# Patient Record
Sex: MALE | Race: WHITE | NOT HISPANIC OR LATINO | URBAN - METROPOLITAN AREA
[De-identification: names, ages, dates, MRNs, and addresses within clinical notes are randomized per-mention and may not be internally consistent; named-entity substitution may affect disease eponyms.]

---

## 2025-05-30 ENCOUNTER — OFFICE VISIT (OUTPATIENT)
Dept: URGENT CARE | Age: 60
End: 2025-05-30
Payer: COMMERCIAL

## 2025-05-30 VITALS
OXYGEN SATURATION: 94 % | TEMPERATURE: 98.2 F | HEART RATE: 104 BPM | DIASTOLIC BLOOD PRESSURE: 92 MMHG | RESPIRATION RATE: 20 BRPM | SYSTOLIC BLOOD PRESSURE: 160 MMHG

## 2025-05-30 DIAGNOSIS — H61.23 BILATERAL IMPACTED CERUMEN: ICD-10-CM

## 2025-05-30 DIAGNOSIS — N30.01 ACUTE CYSTITIS WITH HEMATURIA: Primary | ICD-10-CM

## 2025-05-30 DIAGNOSIS — J30.9 ALLERGIC SINUSITIS: ICD-10-CM

## 2025-05-30 DIAGNOSIS — R31.9 HEMATURIA, UNSPECIFIED TYPE: ICD-10-CM

## 2025-05-30 DIAGNOSIS — R03.0 ELEVATED BP WITHOUT DIAGNOSIS OF HYPERTENSION: ICD-10-CM

## 2025-05-30 PROBLEM — E03.9 ACQUIRED HYPOTHYROIDISM: Status: ACTIVE | Noted: 2023-09-22

## 2025-05-30 LAB
POC APPEARANCE, URINE: ABNORMAL
POC BILIRUBIN, URINE: NEGATIVE
POC BLOOD, URINE: ABNORMAL
POC COLOR, URINE: YELLOW
POC GLUCOSE, URINE: NEGATIVE MG/DL
POC KETONES, URINE: ABNORMAL MG/DL
POC LEUKOCYTES, URINE: ABNORMAL
POC NITRITE,URINE: NEGATIVE
POC PH, URINE: 6.5 PH
POC PROTEIN, URINE: ABNORMAL MG/DL
POC SPECIFIC GRAVITY, URINE: 1.02
POC UROBILINOGEN, URINE: 0.2 EU/DL

## 2025-05-30 RX ORDER — DEXMETHYLPHENIDATE HYDROCHLORIDE 40 MG/1
40 CAPSULE, EXTENDED RELEASE ORAL
COMMUNITY
Start: 2025-05-09

## 2025-05-30 RX ORDER — CLONAZEPAM 1 MG/1
1 TABLET ORAL 2 TIMES DAILY
COMMUNITY

## 2025-05-30 RX ORDER — FLUOXETINE HYDROCHLORIDE 40 MG/1
40 CAPSULE ORAL DAILY
COMMUNITY

## 2025-05-30 RX ORDER — CEFUROXIME AXETIL 500 MG/1
500 TABLET ORAL 2 TIMES DAILY
Qty: 20 TABLET | Refills: 0 | Status: SHIPPED | OUTPATIENT
Start: 2025-05-30 | End: 2025-06-09

## 2025-05-30 RX ORDER — DEXMETHYLPHENIDATE HYDROCHLORIDE 10 MG/1
1 TABLET ORAL
COMMUNITY
Start: 2025-05-09

## 2025-05-30 RX ORDER — LEVOTHYROXINE SODIUM 50 UG/1
50 TABLET ORAL
COMMUNITY

## 2025-05-30 ASSESSMENT — ENCOUNTER SYMPTOMS
NAUSEA: 0
POLYPHAGIA: 0
DYSURIA: 0
SINUS PRESSURE: 1
POLYDIPSIA: 0
WEIGHT LOSS: 0
RHINORRHEA: 1
HEMATURIA: 1
VOMITING: 0
FACIAL SWELLING: 0
FEVER: 0
FATIGUE: 0
FLANK PAIN: 0
SORE THROAT: 0
CHILLS: 0
WHEEZING: 0
TROUBLE SWALLOWING: 0
FREQUENCY: 1
ABDOMINAL PAIN: 0
SINUS PAIN: 0
DIFFICULTY URINATING: 0
COUGH: 0
SHORTNESS OF BREATH: 0

## 2025-05-30 ASSESSMENT — LIFESTYLE VARIABLES: TOBACCO_USE: 0

## 2025-05-30 NOTE — PATIENT INSTRUCTIONS
1. Urinary Tract Infection (UTI):  Medications: Take the prescribed antibiotic exactly as directed. Finish the entire course, even if you start to feel better.  Hydration: Drink plenty of fluids to help flush out the bacteria.  Symptoms to Monitor: Contact your provider or go to the ER if you develop fever, chills, back/flank pain, nausea/vomiting, or worsening urinary symptoms.  2. Seasonal Allergies / Sinusitis:  Medications: Continue using your antihistamine (e.g., Claritin, Zyrtec) and Flonase (nasal steroid spray) daily.  Supportive Care: Use saline nasal spray, steam inhalation, and stay hydrated.  Antibiotics (if prescribed): Take as directed if symptoms suggest bacterial sinusitis  3. Cerumen Impaction (Bilateral):  Ear Care: Over-the-counter earwax softening drops (e.g., Debrox) can be used at home as directed.  Follow-Up: Return to clinic or ENT for irrigation or manual removal if symptoms persist (e.g., hearing loss, pressure, discomfort).  General Instructions:  Get plenty of rest.  Avoid allergens as much as possible.  Follow up with your PCP if symptoms worsen or do not improve as expected.  Return sooner for fever, inability to urinate, back pain, vomiting, severe headache, or new symptoms.

## 2025-05-30 NOTE — PROGRESS NOTES
"Subjective   Patient ID: Pedro Olson is a 60 y.o. male. They present today with a chief complaint of Nasal Congestion (X few weeks on/off - pt has seasonal allergies) and Blood in Urine (X 1 day).    History of Present Illness  60-year-old male presents with acute onset of urinary symptoms starting earlier today, including urinary urgency, frequency, dysuria, and visible blood in the urine. He denies any fever, chills, flank or back pain, and has no concerns for a sexually transmitted infection. Reports possible mild dehydration, stating he drank \"a lot of dark beer\" over the weekend. No prior history of similar symptoms reported. Also reports ear pressure and reduced hearing in both ears over the past week.       Blood in Urine  This is a new problem. The current episode started today. The problem is unchanged. He reports no clotting in his urine stream. His pain is at a severity of 1/10. The pain is mild. He describes his urine color as light pink. Irritative symptoms include frequency and urgency. Irritative symptoms do not include nocturia. Obstructive symptoms do not include dribbling, incomplete emptying, an intermittent stream, a slower stream, straining or a weak stream. Pertinent negatives include no abdominal pain, bladder pain, bone pain, chills, dysuria, facial swelling, fever, flank pain, genital pain, hematospermia, hesitancy, inability to urinate, nausea, urinary retention, vomiting or weight loss. He is not sexually active. There is no history of BPH,  trauma, hypertension, kidney stones, prostatitis, recent infection, sickle cell disease, STDs or tobacco use.       Past Medical History  Allergies as of 05/30/2025    (No Known Allergies)       Prescriptions Prior to Admission[1]     Medical History[2]    Surgical History[3]     reports that he has never smoked. He has never used smokeless tobacco.    Review of Systems  Review of Systems   Constitutional:  Negative for chills, fatigue, fever and " weight loss.   HENT:  Positive for hearing loss, postnasal drip, rhinorrhea and sinus pressure. Negative for congestion, ear discharge, ear pain, facial swelling, sinus pain, sneezing, sore throat and trouble swallowing.    Respiratory:  Negative for cough, shortness of breath and wheezing.    Cardiovascular:  Negative for chest pain.   Gastrointestinal:  Negative for abdominal pain, nausea and vomiting.   Endocrine: Negative for polydipsia, polyphagia and polyuria.   Genitourinary:  Positive for frequency, hematuria and urgency. Negative for decreased urine volume, difficulty urinating, dysuria, flank pain, hesitancy, incomplete emptying, nocturia, penile discharge, penile pain, penile swelling, scrotal swelling and testicular pain.         Objective    Vitals:    05/30/25 1500 05/30/25 1609   BP: (!) 176/96 (!) 160/92   Pulse: 104    Resp: 20    Temp: 36.8 °C (98.2 °F)    TempSrc: Temporal    SpO2: 94%      No LMP for male patient.    Physical Exam  Vitals and nursing note reviewed.   Constitutional:       General: He is not in acute distress.     Appearance: Normal appearance. He is not ill-appearing.   HENT:      Right Ear: External ear normal. There is impacted cerumen.      Left Ear: External ear normal. There is impacted cerumen.      Nose: Rhinorrhea present. No congestion.      Mouth/Throat:      Mouth: Mucous membranes are moist.      Pharynx: Oropharynx is clear. No oropharyngeal exudate or posterior oropharyngeal erythema.   Eyes:      Conjunctiva/sclera: Conjunctivae normal.      Pupils: Pupils are equal, round, and reactive to light.   Cardiovascular:      Rate and Rhythm: Normal rate and regular rhythm.      Pulses: Normal pulses.      Heart sounds: Normal heart sounds. No murmur heard.  Pulmonary:      Effort: Pulmonary effort is normal. No respiratory distress.      Breath sounds: Normal breath sounds.   Abdominal:      General: Abdomen is flat. Bowel sounds are normal.      Tenderness: There is no  abdominal tenderness. There is no right CVA tenderness or left CVA tenderness.   Musculoskeletal:         General: Normal range of motion.      Cervical back: Normal range of motion and neck supple.   Lymphadenopathy:      Cervical: No cervical adenopathy.   Skin:     General: Skin is warm and dry.   Neurological:      Mental Status: He is alert and oriented to person, place, and time.   Psychiatric:         Mood and Affect: Mood normal.         Behavior: Behavior normal.         Ear Cerumen Removal    Date/Time: 5/30/2025 4:10 PM    Performed by: DARCI Washburn  Authorized by: DARCI Washburn    Consent:     Consent obtained:  Verbal    Consent given by:  Patient    Risks, benefits, and alternatives were discussed: yes      Risks discussed:  Bleeding, dizziness, incomplete removal, infection, pain and TM perforation    Alternatives discussed:  No treatment, delayed treatment, alternative treatment, observation and referral  Universal protocol:     Procedure explained and questions answered to patient or proxy's satisfaction: yes      Patient identity confirmed:  Verbally with patient  Procedure details:     Location:  R ear and L ear    Procedure type: irrigation      Procedure outcomes: unable to remove cerumen    Post-procedure details:     Post-procedure ear inspection: no change.    Hearing quality:  Loss of hearing    Procedure completion:  Tolerated well, no immediate complications      Point of Care Test & Imaging Results from this visit  Results for orders placed or performed in visit on 05/30/25   POCT UA Automated manually resulted   Result Value Ref Range    POC Color, Urine Yellow Straw, Yellow, Light-Yellow    POC Appearance, Urine Hazy (A) Clear    POC Glucose, Urine NEGATIVE NEGATIVE mg/dl    POC Bilirubin, Urine NEGATIVE NEGATIVE    POC Ketones, Urine TRACE (A) NEGATIVE mg/dl    POC Specific Gravity, Urine 1.020 1.005 - 1.035    POC Blood, Urine LARGE (3+) (A) NEGATIVE    POC  PH, Urine 6.5 No Reference Range Established PH    POC Protein, Urine 30 (1+) (A) NEGATIVE mg/dl    POC Urobilinogen, Urine 0.2 0.2, 1.0 EU/DL    Poc Nitrite, Urine NEGATIVE NEGATIVE    POC Leukocytes, Urine MODERATE (2+) (A) NEGATIVE      Imaging  No results found.    Cardiology, Vascular, and Other Imaging  No other imaging results found for the past 2 days      Diagnostic study results (if any) were reviewed by DARCI Washburn.    Assessment/Plan   Allergies, medications, history, and pertinent labs/EKGs/Imaging reviewed by DARCI Washburn.     Medical Decision Making  60-year-old male presents with symptoms consistent with a urinary tract infection, seasonal allergic rhinitis, sinus congestion, and bilateral cerumen impaction. Patient is afebrile and hemodynamically stable today. Urinalysis is supportive of UTI; antibiotic initiated. Sinus symptoms are likely related to seasonal allergies and mild sinusitis--treatment includes continuation of antihistamine and nasal steroid. Cerumen impaction noted bilaterally; no signs of infection or tympanic membrane compromise. Conservative management advised with OTC softening drops and ENT referral if symptoms persist. Pt to return in a couple days to try and flush ears. No red flag symptoms at this time. Patient stable for discharge with close outpatient follow-up.    Orders and Diagnoses  Diagnoses and all orders for this visit:  Acute cystitis with hematuria  -     cefuroxime (Ceftin) 500 mg tablet; Take 1 tablet (500 mg) by mouth 2 times a day for 10 days.  -     Urine Culture  Hematuria, unspecified type  -     POCT UA Automated manually resulted  Bilateral impacted cerumen  -     Ear Cerumen Removal; Future  Allergic sinusitis  Elevated BP without diagnosis of hypertension  Other orders  -     Ear Cerumen Removal      Medical Admin Record      Patient disposition: Home    Electronically signed by DARCI Washburn  4:12 PM           [1]  (Not in a hospital admission)   [2] History reviewed. No pertinent past medical history.  [3] History reviewed. No pertinent surgical history.

## 2025-06-01 ENCOUNTER — OFFICE VISIT (OUTPATIENT)
Dept: URGENT CARE | Age: 60
End: 2025-06-01

## 2025-06-01 VITALS
RESPIRATION RATE: 18 BRPM | BODY MASS INDEX: 23 KG/M2 | SYSTOLIC BLOOD PRESSURE: 171 MMHG | OXYGEN SATURATION: 98 % | WEIGHT: 185 LBS | DIASTOLIC BLOOD PRESSURE: 90 MMHG | HEIGHT: 75 IN | TEMPERATURE: 96.5 F | HEART RATE: 102 BPM

## 2025-06-01 DIAGNOSIS — H61.23 BILATERAL IMPACTED CERUMEN: Primary | ICD-10-CM

## 2025-06-01 LAB — BACTERIA UR CULT: ABNORMAL

## 2025-06-01 ASSESSMENT — ENCOUNTER SYMPTOMS
SHORTNESS OF BREATH: 0
CHEST TIGHTNESS: 0
JOINT SWELLING: 0
FEVER: 0
EYE DISCHARGE: 0
EYE PAIN: 0
COUGH: 0
SINUS PAIN: 0
VOMITING: 0
ARTHRALGIAS: 0
SORE THROAT: 0
FATIGUE: 0
NAUSEA: 0
CHILLS: 0
ABDOMINAL PAIN: 0
EYE REDNESS: 0
DIARRHEA: 0
EYE ITCHING: 0
RHINORRHEA: 0

## 2025-06-01 ASSESSMENT — PAIN SCALES - GENERAL: PAINLEVEL_OUTOF10: 0-NO PAIN

## 2025-06-01 NOTE — PROGRESS NOTES
"Subjective   Patient ID: Pedro Olson is a 60 y.o. male. They present today with a chief complaint of Other (Ear flush was here Friday.).    History of Present Illness  Pt returned for ear wash. Has been using Debrox daily.           Past Medical History  Allergies as of 06/01/2025    (No Known Allergies)       Prescriptions Prior to Admission[1]     Medical History[2]    Surgical History[3]     reports that he has never smoked. He has never used smokeless tobacco.    Review of Systems  Review of Systems   Constitutional:  Negative for chills, fatigue and fever.   HENT:  Negative for ear discharge, ear pain, rhinorrhea, sinus pain, sneezing and sore throat.         Clogged ear   Eyes:  Negative for pain, discharge, redness and itching.   Respiratory:  Negative for cough, chest tightness and shortness of breath.    Cardiovascular:  Negative for chest pain.   Gastrointestinal:  Negative for abdominal pain, diarrhea, nausea and vomiting.   Musculoskeletal:  Negative for arthralgias and joint swelling.   Skin:  Negative for rash.                                  Objective    Vitals:    06/01/25 1028   BP: 171/90   BP Location: Right arm   Patient Position: Sitting   BP Cuff Size: Adult   Pulse: 102   Resp: 18   Temp: 35.8 °C (96.5 °F)   TempSrc: Temporal   SpO2: 98%   Weight: 83.9 kg (185 lb)   Height: 1.905 m (6' 3\")     No LMP for male patient.    Physical Exam  Constitutional:       Appearance: Normal appearance.   HENT:      Head: Normocephalic and atraumatic.      Right Ear: Tympanic membrane, ear canal and external ear normal. There is impacted cerumen.      Left Ear: Tympanic membrane, ear canal and external ear normal. There is impacted cerumen.   Cardiovascular:      Rate and Rhythm: Normal rate and regular rhythm.      Heart sounds: No murmur heard.  Pulmonary:      Effort: Pulmonary effort is normal.   Neurological:      Mental Status: He is alert and oriented to person, place, and time.   Psychiatric:        "  Mood and Affect: Mood normal.         Ear Cerumen Removal    Date/Time: 6/1/2025 10:41 AM    Performed by: Karen Molina PA-C  Authorized by: Karen Molina PA-C    Consent:     Consent obtained:  Verbal    Consent given by:  Patient    Risks, benefits, and alternatives were discussed: yes      Risks discussed:  Bleeding, infection, pain, dizziness, incomplete removal and TM perforation    Alternatives discussed:  No treatment and alternative treatment  Universal protocol:     Patient identity confirmed:  Verbally with patient  Procedure details:     Location:  L ear and R ear    Procedure type: irrigation      Procedure outcomes: cerumen removed    Post-procedure details:     Inspection:  TM intact and no bleeding    Hearing quality:  Normal    Procedure completion:  Tolerated well, no immediate complications      Point of Care Test & Imaging Results from this visit  No results found for this visit on 06/01/25.   Imaging  No results found.    Cardiology, Vascular, and Other Imaging  No other imaging results found for the past 2 days      Diagnostic study results (if any) were reviewed by Karen Molina PA-C.    Assessment/Plan   Allergies, medications, history, and pertinent labs/EKGs/Imaging reviewed by Karen Molina PA-C.     Medical Decision Making  No s/s indicating ear infection    Orders and Diagnoses  Diagnoses and all orders for this visit:  Bilateral impacted cerumen  -     Ear Cerumen Removal      Medical Admin Record      Patient disposition: Home    Electronically signed by Karen Molina PA-C  11:19 AM           [1] (Not in a hospital admission)   [2] History reviewed. No pertinent past medical history.  [3] History reviewed. No pertinent surgical history.

## 2025-06-02 ENCOUNTER — TELEPHONE (OUTPATIENT)
Dept: URGENT CARE | Age: 60
End: 2025-06-02

## 2025-06-02 LAB — BACTERIA UR CULT: ABNORMAL
